# Patient Record
(demographics unavailable — no encounter records)

---

## 2025-02-26 NOTE — HISTORY OF PRESENT ILLNESS
[FreeTextEntry1] : 39 year old female here today as new patient for annual? PMHx? SHx? Hx of abnormal pap? Familial hx of breast, ovarian or colon cancer?

## 2025-03-13 NOTE — DISCUSSION/SUMMARY
[FreeTextEntry1] : Pap/HPV repeated today as result are unclear and cannot get hold of provider at [facility] Will have follow up appointment in 10 days to review results and determine management plan.  Patient verbalizes understanding of and agreement with this plan.  All questions answered to patient's satisfaction.

## 2025-03-13 NOTE — DISCUSSION/SUMMARY
[FreeTextEntry1] : Pap/HPV repeated today as result are unclear and cannot get hold of provider at [facility] Will have follow up appointment in 10 days to review results and determine management plan.  Patient verbalizes understanding of and agreement with this plan.  All questions answered to patient's satisfaction.

## 2025-03-13 NOTE — HISTORY OF PRESENT ILLNESS
[FreeTextEntry1] : 40 yo  (ectopic x 1) was referred by [facility] for colposcopy.   She brings her Pap/HPV test results from 25 which indicate +LSIL but HPV result does not appear to be positive for High Risk 16/18 or HPV high risk non16/18.   ASCCP Guidelines dictate that LSIL with neg HPV should be repeated in one year rather than colposcopy.  It was also unclear to patient whether or not her HPV result was positive or negative.  Previous hx abnormal pap many years ago and all subsequent Paps/HPV have been negative.

## 2025-03-13 NOTE — HISTORY OF PRESENT ILLNESS
[FreeTextEntry1] : 40 yo  (ectopic x 1) was referred by [facility] for colposcopy.   She brings her Pap/HPV test results from 25 which indicate +LSIL but HPV result does not appear to be positive for High Risk 16/18 or HPV high risk non16/18.   ASCCP Guidelines dictate that LSIL with neg HPV should be repeated in one year rather than colposcopy.  It was also unclear to patient whether or not her HPV result was positive or negative.  Previous hx abnormal pap many years ago and all subsequent Paps/HPV have been negative.

## 2025-03-29 NOTE — HISTORY OF PRESENT ILLNESS
[FreeTextEntry1] : 38 yo  (ectopic x 1) was referred by [facility] for colposcopy. on 3/13/25.  She brought her Pap/HPV test results from 25 which indicated +LSIL but HPV result did not appear to be positive for High Risk 16/18 or HPV high risk non16/18. ASCCP Guidelines dictate that LSIL with neg HPV should be repeated in one year rather than colposcopy. It was also unclear to patient whether or not her HPV result was positive or negative.  Repeat Pap/HPV done at that time Pap: ASCUS HPV: negative One year follow up for both tests is recommended.

## 2025-03-29 NOTE — HISTORY OF PRESENT ILLNESS
[FreeTextEntry1] : 38 yo  (ectopic x 1) was referred by [facility] for colposcopy. on 3/13/25.  She brought her Pap/HPV test results from 25 which indicated +LSIL but HPV result did not appear to be positive for High Risk 16/18 or HPV high risk non16/18. ASCCP Guidelines dictate that LSIL with neg HPV should be repeated in one year rather than colposcopy. It was also unclear to patient whether or not her HPV result was positive or negative.  Repeat Pap/HPV done at that time Pap: ASCUS HPV: negative One year follow up for both tests is recommended.